# Patient Record
Sex: FEMALE | Race: WHITE | Employment: FULL TIME | URBAN - METROPOLITAN AREA
[De-identification: names, ages, dates, MRNs, and addresses within clinical notes are randomized per-mention and may not be internally consistent; named-entity substitution may affect disease eponyms.]

---

## 2023-03-06 LAB — THYROTROPIN (MIU/L) IN SER/PLAS BY DETECTION LIMIT <= 0.05 MIU/L: 0.42 MIU/L (ref 0.44–3.98)

## 2024-05-13 ENCOUNTER — OFFICE VISIT (OUTPATIENT)
Dept: ENDOCRINOLOGY | Facility: CLINIC | Age: 54
End: 2024-05-13
Payer: COMMERCIAL

## 2024-05-13 ENCOUNTER — LAB (OUTPATIENT)
Dept: LAB | Facility: LAB | Age: 54
End: 2024-05-13
Payer: COMMERCIAL

## 2024-05-13 VITALS
HEART RATE: 76 BPM | HEIGHT: 65 IN | SYSTOLIC BLOOD PRESSURE: 120 MMHG | WEIGHT: 148.4 LBS | RESPIRATION RATE: 16 BRPM | BODY MASS INDEX: 24.72 KG/M2 | DIASTOLIC BLOOD PRESSURE: 76 MMHG

## 2024-05-13 DIAGNOSIS — E03.9 HYPOTHYROIDISM, UNSPECIFIED TYPE: ICD-10-CM

## 2024-05-13 DIAGNOSIS — R76.8 POSITIVE ANA (ANTINUCLEAR ANTIBODY): ICD-10-CM

## 2024-05-13 DIAGNOSIS — R53.83 OTHER FATIGUE: ICD-10-CM

## 2024-05-13 DIAGNOSIS — E03.9 HYPOTHYROIDISM, UNSPECIFIED TYPE: Primary | ICD-10-CM

## 2024-05-13 PROCEDURE — 99214 OFFICE O/P EST MOD 30 MIN: CPT | Performed by: INTERNAL MEDICINE

## 2024-05-13 PROCEDURE — 84443 ASSAY THYROID STIM HORMONE: CPT

## 2024-05-13 PROCEDURE — 84481 FREE ASSAY (FT-3): CPT

## 2024-05-13 PROCEDURE — 84439 ASSAY OF FREE THYROXINE: CPT

## 2024-05-13 PROCEDURE — 1036F TOBACCO NON-USER: CPT | Performed by: INTERNAL MEDICINE

## 2024-05-13 PROCEDURE — 36415 COLL VENOUS BLD VENIPUNCTURE: CPT

## 2024-05-13 RX ORDER — LANOLIN ALCOHOL/MO/W.PET/CERES
100 CREAM (GRAM) TOPICAL DAILY
COMMUNITY

## 2024-05-13 RX ORDER — LEVOTHYROXINE, LIOTHYRONINE 19; 4.5 UG/1; UG/1
30 TABLET ORAL DAILY
COMMUNITY
Start: 2024-04-22

## 2024-05-13 RX ORDER — LEVOTHYROXINE, LIOTHYRONINE 9.5; 2.25 UG/1; UG/1
TABLET ORAL
COMMUNITY
Start: 2024-04-22

## 2024-05-13 ASSESSMENT — ENCOUNTER SYMPTOMS
SHORTNESS OF BREATH: 0
PALPITATIONS: 0
NAUSEA: 0
HEADACHES: 0
DIARRHEA: 0
COUGH: 0
FATIGUE: 0
VOMITING: 0
CHILLS: 0
FEVER: 0

## 2024-05-13 NOTE — PROGRESS NOTES
Endocrinology: Follow up visit  Subjective   Patient ID: Erica Garcia is a 53 y.o. female who presents for Hypothyroidism and Hashimoto's Thyroiditis.    PCP: Kaiden Garvin MD    HPI  53-year-old here for evaluation of hypothyroidism.  Of note I have seen her in the past for the same diagnosis but not since 2016.  At that time we had rechecked her levels and found she had Hashimoto's but her thyroid levels were back to normal.  She was monitored from then until about 2021 when after a bout of COVID and extreme stress her TSH was found to have risen to 29.  She was initially started on levothyroxine but continued to struggle with not feeling well and dry skin.  She then saw Dr. Callahan in 2022 and tried levothyroxine plus liothyronine.  She found taking the second dose of liothyronine cumbersome and eventually was seen by functional medicine and put on NP thyroid 45 mg about 4 months ago.  She was noted to have continued Hashimoto's antibodies as well as antithyroglobulin antibodies and a mildly positive BETO She has not had blood work yet on this medication regimen.  She does not feel like she feels any different.  She is taking it as directed on an empty stomach.  She has headaches on and off and feels fatigued.  She has difficulty sleeping and often times wakes up in the middle the night and as she cannot get back to sleep she does laundry and job duties.  She also notes continued issues with dry skin.      Review of Systems   Constitutional:  Negative for chills, fatigue and fever.   Respiratory:  Negative for cough and shortness of breath.    Cardiovascular:  Negative for chest pain and palpitations.   Gastrointestinal:  Negative for diarrhea, nausea and vomiting.   Neurological:  Negative for headaches.       Patient Active Problem List   Diagnosis    Hypothyroidism    Other fatigue    Positive BETO (antinuclear antibody)        Home Meds:  Current Outpatient Medications   Medication  "Instructions    acetylcysteine (NAC ORAL) oral    cholecalciferol, vitamin D3, (VITAMIN D3 ORAL) oral    GLUTAMINE, BULK, MISC miscellaneous    NP Thyroid 15 mg tablet take 1 tablet by mouth once daily - WITH 30MG    NP Thyroid 30 mg, oral, Daily    OMEGA-3-EPA-FISH OIL ORAL oral    thiamine (VITAMIN B-1) 100 mg, oral, Daily        No Known Allergies     Objective   Vitals:    05/13/24 1459   BP: 120/76   Pulse: 76   Resp: 16      Vitals:    05/13/24 1459   Weight: 67.3 kg (148 lb 6.4 oz)      Body mass index is 24.7 kg/m².   Physical Exam  Constitutional:       Appearance: Normal appearance. She is normal weight.   HENT:      Head: Normocephalic and atraumatic.   Neck:      Thyroid: No thyroid mass, thyromegaly or thyroid tenderness.   Cardiovascular:      Rate and Rhythm: Normal rate and regular rhythm.      Heart sounds: No murmur heard.     No gallop.   Pulmonary:      Effort: Pulmonary effort is normal.      Breath sounds: Normal breath sounds.   Abdominal:      Palpations: Abdomen is soft.      Comments: benign   Neurological:      General: No focal deficit present.      Mental Status: She is alert and oriented to person, place, and time.      Deep Tendon Reflexes: Reflexes are normal and symmetric.   Psychiatric:         Behavior: Behavior is cooperative.         Labs:  Lab Results   Component Value Date    TSH 0.42 (L) 03/06/2023    FREET4 1.31 09/13/2022      No results found for: \"PR1\", \"THYROIDPAB\", \"TSI\"     Assessment/Plan   Problem List Items Addressed This Visit       Hypothyroidism - Primary    Relevant Orders    Thyroxine, Free    Thyroid Stimulating Hormone    Triiodothyronine, Free    Other fatigue    Positive BETO (antinuclear antibody)     53-year-old here for evaluation of hypothyroidism.  Discussed her course.  We reviewed Hashimoto's as the most common cause of hypothyroidism and additional positivity of other thyroid autoantibodies such as antithyroglobulin antibody is fairly common.  " Regardless of the antibody titers thyroid management is the same.  She has not yet had blood work done on her current NP thyroid dosing.  We discussed options including adjusting NP thyroid versus levothyroxine plus Cytomel versus levothyroxine/Synthroid.  She will think about options.  We also did discuss nonspecificity of hypothyroid symptoms and I would recommend follow-up with her primary care regarding the BETO sleep disturbances and current symptoms especially if thyroid blood work is unremarkable.  Further plans based on test results.  I encouraged her to call or message with concerns or questions  Electronically signed by:  Kendra Multani MD 05/13/24 9:23 PM

## 2024-05-14 LAB
T3FREE SERPL-MCNC: 3.2 PG/ML (ref 2.3–4.2)
T4 FREE SERPL-MCNC: 0.96 NG/DL (ref 0.78–1.48)
TSH SERPL-ACNC: 3.05 MIU/L (ref 0.44–3.98)

## 2024-05-14 RX ORDER — LEVOTHYROXINE SODIUM 88 UG/1
88 TABLET ORAL DAILY
Qty: 90 TABLET | Refills: 1 | Status: SHIPPED | OUTPATIENT
Start: 2024-05-14 | End: 2025-05-14

## 2024-05-14 NOTE — PATIENT INSTRUCTIONS
Blood work today  Further plans based on test results  Please call or message with any concerns or questions

## 2024-07-16 ENCOUNTER — TRANSCRIBE ORDERS (OUTPATIENT)
Dept: OBSTETRICS AND GYNECOLOGY | Facility: CLINIC | Age: 54
End: 2024-07-16
Payer: COMMERCIAL

## 2024-07-16 DIAGNOSIS — Z12.31 BREAST CANCER SCREENING BY MAMMOGRAM: Primary | ICD-10-CM

## 2024-09-10 ENCOUNTER — APPOINTMENT (OUTPATIENT)
Dept: ENDOCRINOLOGY | Facility: CLINIC | Age: 54
End: 2024-09-10
Payer: COMMERCIAL

## 2024-09-20 ENCOUNTER — APPOINTMENT (OUTPATIENT)
Dept: RADIOLOGY | Facility: CLINIC | Age: 54
End: 2024-09-20
Payer: COMMERCIAL

## 2024-09-20 ENCOUNTER — HOSPITAL ENCOUNTER (OUTPATIENT)
Dept: RADIOLOGY | Facility: CLINIC | Age: 54
Discharge: HOME | End: 2024-09-20
Payer: COMMERCIAL

## 2024-09-20 VITALS — BODY MASS INDEX: 24.66 KG/M2 | HEIGHT: 65 IN | WEIGHT: 148 LBS

## 2024-09-20 DIAGNOSIS — Z12.31 BREAST CANCER SCREENING BY MAMMOGRAM: ICD-10-CM

## 2024-09-20 PROCEDURE — 77067 SCR MAMMO BI INCL CAD: CPT

## 2024-09-23 ENCOUNTER — APPOINTMENT (OUTPATIENT)
Dept: OBSTETRICS AND GYNECOLOGY | Facility: CLINIC | Age: 54
End: 2024-09-23
Payer: COMMERCIAL

## 2024-09-23 VITALS
BODY MASS INDEX: 24.99 KG/M2 | DIASTOLIC BLOOD PRESSURE: 72 MMHG | SYSTOLIC BLOOD PRESSURE: 108 MMHG | WEIGHT: 150 LBS | HEIGHT: 65 IN

## 2024-09-23 DIAGNOSIS — Z01.419 WELL WOMAN EXAM: Primary | ICD-10-CM

## 2024-09-23 PROCEDURE — 3008F BODY MASS INDEX DOCD: CPT | Performed by: OBSTETRICS & GYNECOLOGY

## 2024-09-23 PROCEDURE — 1036F TOBACCO NON-USER: CPT | Performed by: OBSTETRICS & GYNECOLOGY

## 2024-09-23 PROCEDURE — 99396 PREV VISIT EST AGE 40-64: CPT | Performed by: OBSTETRICS & GYNECOLOGY

## 2024-09-23 ASSESSMENT — ENCOUNTER SYMPTOMS
CHILLS: 0
ABDOMINAL PAIN: 0
DYSURIA: 0
FLANK PAIN: 0
BACK PAIN: 0
NAUSEA: 0
SLEEP DISTURBANCE: 0
CONSTIPATION: 0
COLOR CHANGE: 0
BLOOD IN STOOL: 0
FREQUENCY: 0
VOMITING: 0
FATIGUE: 0
SHORTNESS OF BREATH: 0
UNEXPECTED WEIGHT CHANGE: 0
HEMATURIA: 0
DIARRHEA: 0
APPETITE CHANGE: 0
ABDOMINAL DISTENTION: 0
FEVER: 0

## 2024-09-23 ASSESSMENT — PAIN SCALES - GENERAL: PAINLEVEL: 0-NO PAIN

## 2024-09-23 NOTE — PROGRESS NOTES
"History Of Present Illness  Routine Gyn Exam  Erica Garcia here for routine WWE.  Pt is postmenopausal.  Denies spotting or bleeding.     Concerns: none.     New health issues or surgeries since last visit: working on thyroid disorder with functional medicine specialist (Dr. Eldridge).  She is taking oral progesterone, topical testosterone and estroDim supplements.    Exercise: irregular . Walking, tennis, golf.  Works full time at [employer] in [email or web address]     Gynecologic History  Postmenopausal.  Last Pap: .   Last mammogram: .   Last Colonoscopy:  never.       Obstetric History  OB History    Para Term  AB Living   3 3 3         SAB IAB Ectopic Multiple Live Births                  # Outcome Date GA Lbr Dmitriy/2nd Weight Sex Type Anes PTL Lv   3 Term            2 Term            1 Term                 Review of Systems   Constitutional:  Negative for appetite change, chills, fatigue, fever and unexpected weight change.   Respiratory:  Negative for shortness of breath.    Cardiovascular:  Negative for chest pain.   Gastrointestinal:  Negative for abdominal distention, abdominal pain, blood in stool, constipation, diarrhea, nausea and vomiting.   Endocrine: Negative for cold intolerance and heat intolerance.   Genitourinary:  Negative for dyspareunia, dysuria, flank pain, frequency, genital sores, hematuria, menstrual problem, pelvic pain, urgency, vaginal bleeding, vaginal discharge and vaginal pain.   Musculoskeletal:  Negative for back pain.   Skin:  Negative for color change.   Psychiatric/Behavioral:  Negative for sleep disturbance.        /72 (BP Location: Left arm, Patient Position: Sitting)   Ht 1.651 m (5' 5\")   Wt 68 kg (150 lb)   LMP 2022 (Approximate)   BMI 24.96 kg/m²      Physical Exam  Constitutional:       Appearance: Normal appearance.   HENT:      Head: Normocephalic and atraumatic.   Chest:   Breasts:     Right: Normal.      Left: Normal.   Abdominal: "      General: Abdomen is flat.      Palpations: Abdomen is soft.      Tenderness: There is no abdominal tenderness.   Genitourinary:     General: Normal vulva.      Vagina: Normal.      Cervix: Normal.      Uterus: Normal.       Adnexa: Right adnexa normal and left adnexa normal.   Skin:     General: Skin is warm and dry.   Neurological:      Mental Status: She is alert and oriented to person, place, and time.   Psychiatric:         Mood and Affect: Mood normal.              Assessment/Plan         Routine Well Woman Exam Today  Discussed diet and exercise.   Reviewed routine health screenings.   Pap: 2022 wnl   Recommend annual mammograms. Mammogram done recently.                  Olga Lala MD

## 2025-09-24 ENCOUNTER — APPOINTMENT (OUTPATIENT)
Dept: OBSTETRICS AND GYNECOLOGY | Facility: CLINIC | Age: 55
End: 2025-09-24
Payer: COMMERCIAL